# Patient Record
Sex: FEMALE | Race: WHITE | NOT HISPANIC OR LATINO | URBAN - METROPOLITAN AREA
[De-identification: names, ages, dates, MRNs, and addresses within clinical notes are randomized per-mention and may not be internally consistent; named-entity substitution may affect disease eponyms.]

---

## 2019-09-20 ENCOUNTER — APPOINTMENT (RX ONLY)
Dept: URBAN - METROPOLITAN AREA CLINIC 303 | Facility: CLINIC | Age: 84
Setting detail: DERMATOLOGY
End: 2019-09-20

## 2019-09-20 DIAGNOSIS — L57.8 OTHER SKIN CHANGES DUE TO CHRONIC EXPOSURE TO NONIONIZING RADIATION: ICD-10-CM

## 2019-09-20 PROBLEM — D48.5 NEOPLASM OF UNCERTAIN BEHAVIOR OF SKIN: Status: ACTIVE | Noted: 2019-09-20

## 2019-09-20 PROCEDURE — 17264 DSTRJ MAL LES T/A/L 3.1-4.0: CPT

## 2019-09-20 PROCEDURE — ? COUNSELING

## 2019-09-20 PROCEDURE — ? FULL BODY SKIN EXAM - DECLINED

## 2019-09-20 PROCEDURE — 99201: CPT | Mod: 25

## 2019-09-20 PROCEDURE — ? BIOPSY BY SHAVE METHOD AND DESTRUCTION

## 2019-09-20 ASSESSMENT — LOCATION SIMPLE DESCRIPTION DERM
LOCATION SIMPLE: RIGHT FOREARM
LOCATION SIMPLE: LEFT FOREARM

## 2019-09-20 ASSESSMENT — LOCATION DETAILED DESCRIPTION DERM
LOCATION DETAILED: RIGHT DISTAL DORSAL FOREARM
LOCATION DETAILED: LEFT DISTAL RADIAL DORSAL FOREARM

## 2019-09-20 ASSESSMENT — LOCATION ZONE DERM: LOCATION ZONE: ARM

## 2019-09-20 NOTE — PROCEDURE: FULL BODY SKIN EXAM - DECLINED
Detail Level: Simple
Instructions: This plan will send the code FBSD to the PM system.  DO NOT or CHANGE the price.
Price (Do Not Change): 0.00

## 2019-09-20 NOTE — PROCEDURE: COUNSELING
Detail Level: Zone
Patient Specific Counseling (Will Not Stick From Patient To Patient): Daughter was also in the room and explained the diagnosis and treatment plan which they agreed to
Detail Level: Detailed

## 2019-09-20 NOTE — PROCEDURE: BIOPSY BY SHAVE METHOD AND DESTRUCTION
Lab: 6
Billing Type: Third-Party Bill
Destruction Type: electrodesiccation
Render Path Notes In Note?: No
Anesthesia Type: 1% lidocaine with epinephrine
Size Of Lesion In Cm (Optional): 0
Biopsy Type: H and E
Post-Care Instructions: I reviewed with the patient in detail post-care instructions. Patient is to keep the biopsy site dry overnight, and then apply bacitracin twice daily until healed. Patient may apply hydrogen peroxide soaks to remove any crusting.
Number Of Curettages: 3
Notification Instructions: Patient will be notified of biopsy results. However, patient instructed to call the office if not contacted within 2 weeks.
Anesthesia Volume In Cc: 0.5
Size Of Lesion After Curettage: 3.7
Bill As?: Malignant Destruction
Detail Level: Detailed
Wound Care: Petrolatum
Hemostasis: Drysol
Consent: Written consent was obtained and risks were reviewed including but not limited to scarring, infection, bleeding, scabbing, incomplete removal, nerve damage and allergy to anesthesia.